# Patient Record
Sex: FEMALE | Race: WHITE
[De-identification: names, ages, dates, MRNs, and addresses within clinical notes are randomized per-mention and may not be internally consistent; named-entity substitution may affect disease eponyms.]

---

## 2019-02-28 ENCOUNTER — HOSPITAL ENCOUNTER (OUTPATIENT)
Dept: HOSPITAL 33 - SDC | Age: 44
Discharge: HOME | End: 2019-02-28
Attending: SURGERY
Payer: COMMERCIAL

## 2019-02-28 VITALS — SYSTOLIC BLOOD PRESSURE: 112 MMHG | OXYGEN SATURATION: 99 % | DIASTOLIC BLOOD PRESSURE: 56 MMHG | HEART RATE: 71 BPM

## 2019-02-28 DIAGNOSIS — R19.7: Primary | ICD-10-CM

## 2019-02-28 DIAGNOSIS — K62.5: ICD-10-CM

## 2019-02-28 LAB
C CAYETANENSIS DNA STL QL NAA+NON-PROBE: NEGATIVE
C DIFF TOX A+B STL QL: NEGATIVE
E COLI SXT STL QL IA: NEGATIVE
E HISTOLYT DNA SPEC QL NAA+PROBE: NEGATIVE
ENTEROAGGREGATIVE E.COLI: NEGATIVE

## 2019-02-28 PROCEDURE — 86140 C-REACTIVE PROTEIN: CPT

## 2019-02-28 PROCEDURE — 82397 CHEMILUMINESCENT ASSAY: CPT

## 2019-02-28 PROCEDURE — 36415 COLL VENOUS BLD VENIPUNCTURE: CPT

## 2019-02-28 PROCEDURE — 83520 IMMUNOASSAY QUANT NOS NONAB: CPT

## 2019-02-28 PROCEDURE — 88346 IMFLUOR 1ST 1ANTB STAIN PX: CPT

## 2019-02-28 PROCEDURE — 87507 IADNA-DNA/RNA PROBE TQ 12-25: CPT

## 2019-02-28 PROCEDURE — 81479 UNLISTED MOLECULAR PATHOLOGY: CPT

## 2019-02-28 NOTE — HP
DATE OF SURGERY:  2019 



ANTICIPATED PROCEDURE: Colonoscopy for stool sample. 



HISTORY OF PRESENT ILLNESS: The patient is a 43 year-old who has rectal bleeding, some 
diarrhea, some loose stools, presents for colonoscopy. 



PAST MEDICAL HISTORY: 

ALLERGIES: SULFA, CODEINE, DILAUDID.

MEDICATIONS: Symbyax, gabapentin, Xanax. 



PAST SURGICAL HISTORY:  section x2. Breast reduction. Knee surgery. 



SOCIAL HISTORY: Negative.  

FAMILY HISTORY: Negative.



PHYSICAL EXAMINATION:  

VITAL SIGNS: Normal.

CHEST:  Clear.

COR: Regular.

ABDOMEN: Satisfactory. 



IMPRESSION: C-scope for stool sample.

## 2019-02-28 NOTE — OP
SURGERY DATE/TIME:  02/28/2019  0847



PREOPERATIVE DIAGNOSIS:     Patient has had diarrhea for over a month. 



PROCEDURE:    Colonoscopy. 



SURGEON:        Jon Padilla M.D.



ANESTHESIA:     MAC.



COMPLICATIONS:    None.



CONDITION:        Stable.



INDICATION:  A patient requiring evaluation.   



DESCRIPTION OF PROCEDURE: She is taken to endoscopy. Left lateral decubitus position. MAC 
sedation provided. Excellent anesthesia level present. Anal digital examination 
satisfactory. Scope introduced. Scope advanced to the cecum. Base of cecum, ileocecal 
valve confirmed. Ascending colon, right transverse, mid transverse there was irritation. 
Representative biopsy x2 submitted in one jar. Stool trap had been collected and was full 
at this time and submitted for Clostridium difficile, ova and parasite and stool 
pathogens. On further withdrawal splenic, descending, sigmoid, particularly in the rectum 
quite a bit of irritation. Representative biopsy submitted. The patient does have a father 
with Crohn's disease. Inflammatory bowel profile has been ordered today. We have the 
biopsies and we have the stool sample. We will see her back in the office in two weeks.